# Patient Record
Sex: FEMALE | Race: WHITE | Employment: FULL TIME | URBAN - METROPOLITAN AREA
[De-identification: names, ages, dates, MRNs, and addresses within clinical notes are randomized per-mention and may not be internally consistent; named-entity substitution may affect disease eponyms.]

---

## 2023-04-26 ENCOUNTER — HOSPITAL ENCOUNTER (EMERGENCY)
Facility: CLINIC | Age: 53
Discharge: HOME | End: 2023-04-26
Attending: EMERGENCY MEDICINE
Payer: COMMERCIAL

## 2023-04-26 VITALS
TEMPERATURE: 98.6 F | HEART RATE: 79 BPM | RESPIRATION RATE: 17 BRPM | BODY MASS INDEX: 18.48 KG/M2 | SYSTOLIC BLOOD PRESSURE: 152 MMHG | HEIGHT: 66 IN | OXYGEN SATURATION: 100 % | WEIGHT: 115 LBS | DIASTOLIC BLOOD PRESSURE: 82 MMHG

## 2023-04-26 DIAGNOSIS — S76.211A STRAIN OF GROIN, RIGHT, INITIAL ENCOUNTER: Primary | ICD-10-CM

## 2023-04-26 PROCEDURE — 99282 EMERGENCY DEPT VISIT SF MDM: CPT

## 2023-04-26 ASSESSMENT — PAIN SCALES - GENERAL
PAINLEVEL_OUTOF10: 5
PAINLEVEL_OUTOF10: 5

## 2023-04-26 NOTE — ED PROVIDER NOTES
[address] [facility] E ED  Emergency Department Encounter  Mid Level Provider     Pt Name: Stephanie Aparicio  MRN: [id number]  [place] 1970  Date of evaluation: 4/26/23  PCP:  No primary care provider on file. CHIEF COMPLAINT       Chief Complaint   Patient presents with    Hip Pain     Right hip pain / 5 weeks       HISTORY OF PRESENT ILLNESS  (Location/Symptom, Timing/Onset,Context/Setting, Quality, Duration, Modifying Factors, Severity.)      Stephanie Aparicio is a 48 y.o. female who presents with report that 5 weeks ago when going to get on the treadmill she felt sudden pain in the left hip that radiated down to the groin and through the inner thigh. Its been intermittent since. She states when it does occur it is very severe to the point where she cannot walk. She states the pain will be at its most in the inguinal region and will be intense burning sensation. She states sometimes it gets so bad she cannot walk. Pain also worsens when she abducts her left hip. Patient does not have any history of clotting disorders. She has no other associated symptoms. She is on HRT. Came in at the encouragement of family. PAST MEDICAL /SURGICAL / SOCIAL / FAMILY HISTORY      has no past medical history on file. has a past surgical history that includes Appendectomy.     Social History     Socioeconomic History    Marital status:      Spouse name: Not on file    Number of children: Not on file    Years of education: Not on file    Highest education level: Not on file   Occupational History    Not on file   Tobacco Use    Smoking status: Never    Smokeless tobacco: Never   Substance and Sexual Activity    Alcohol use: Never    Drug use: Never    Sexual activity: Not on file   Other Topics Concern    Not on file   Social History Narrative    Not on file     Social Determinants of Health     Financial Resource Strain: Not on file   Food Insecurity: Not on file   Transportation Needs: Not on file

## 2023-04-26 NOTE — DISCHARGE INSTRUCTIONS
Return immediately if any worsening symptoms or any other concerns    Tell us how we did visit: [email or web address]   and let us know about your experience

## 2023-04-26 NOTE — ED PROVIDER NOTES
Emergency Department           COMPLAINT       Chief Complaint   Patient presents with    Hip Pain     Right hip pain / 5 weeks      PHYSICAL EXAM      ED Triage Vitals   BP Temp Temp src Heart Rate Resp SpO2 Height Weight   04/26/23 1437 04/26/23 1440 -- 04/26/23 1437 04/26/23 1438 04/26/23 1438 04/26/23 1438 04/26/23 1438   (!) 152/82 98.6 °F (37 °C)  79 17 100 % 5' 6\" (1.676 m) 115 lb (52.2 kg)         Constitutional: Alert, oriented x3, nontoxic, answering questions appropriately, acting properly for age, in no acute distress   HEENT: Extraocular muscles intact,  Neck: Trachea midline   Gastrointestinal: Soft, nontender, nondistended, positive bowel sounds. No rebound, rigidity, or guarding. Musculoskeletal: Right lower extremity tenderness along the abductors and inguinal area no hernia. No pain at the knee or ankle. No calf tenderness or asymmetry  Neurologic: Moving all 4 extremities  Skin: Warm and dry       Physical Exam  DIAGNOSTIC RESULTS     EKG: All EKG's are interpreted by the Emergency Department Physician who either signs or Co-signs this chart in the absence of a cardiologist.    Not indicated unless otherwise documented above or in the midlevel documentation    LABS:  No results found for this visit on 04/26/23. Not indicated unless otherwise documented above or in the midlevel documentation    RADIOLOGY:   I reviewedthe radiologist interpretations:  No orders to display       Not indicated unless otherwise documented above or in the midlevel documentation    EMERGENCY DEPARTMENT COURSE:     The patient was given the following medications:  No orders of the defined types were placed in this encounter. PERTINENT ATTENDING PHYSICIAN COMMENTS:    Right inguinal pain for the past 5 weeks. Reproducible to movement and palpation. No urinary complaints. No weakness. Denies any other complaints. Some family members were concerned about DVT she has no calf swelling or pain.